# Patient Record
Sex: FEMALE | Race: WHITE | URBAN - METROPOLITAN AREA
[De-identification: names, ages, dates, MRNs, and addresses within clinical notes are randomized per-mention and may not be internally consistent; named-entity substitution may affect disease eponyms.]

---

## 2024-10-09 ENCOUNTER — OFFICE VISIT (OUTPATIENT)
Age: 24
End: 2024-10-09

## 2024-10-09 VITALS
WEIGHT: 177.8 LBS | HEART RATE: 89 BPM | RESPIRATION RATE: 18 BRPM | SYSTOLIC BLOOD PRESSURE: 123 MMHG | HEIGHT: 66 IN | TEMPERATURE: 99 F | BODY MASS INDEX: 28.57 KG/M2 | OXYGEN SATURATION: 97 % | DIASTOLIC BLOOD PRESSURE: 84 MMHG

## 2024-10-09 DIAGNOSIS — L03.317 CELLULITIS OF BUTTOCK: ICD-10-CM

## 2024-10-09 DIAGNOSIS — L05.91 PILONIDAL CYST WITHOUT ABSCESS: Primary | ICD-10-CM

## 2024-10-09 RX ORDER — FLUOXETINE 10 MG/1
10 CAPSULE ORAL DAILY
COMMUNITY

## 2024-10-09 RX ORDER — METRONIDAZOLE 500 MG/1
500 TABLET ORAL 2 TIMES DAILY
Qty: 14 TABLET | Refills: 0 | Status: SHIPPED | OUTPATIENT
Start: 2024-10-09 | End: 2024-10-16

## 2024-10-09 RX ORDER — CEPHALEXIN 500 MG/1
500 CAPSULE ORAL 4 TIMES DAILY
Qty: 40 CAPSULE | Refills: 0 | Status: SHIPPED | OUTPATIENT
Start: 2024-10-09 | End: 2024-10-19

## 2024-10-09 ASSESSMENT — ENCOUNTER SYMPTOMS
CONSTIPATION: 0
ABDOMINAL PAIN: 0
DIARRHEA: 0
SHORTNESS OF BREATH: 0
COUGH: 0
VOMITING: 0
NAUSEA: 0
BLOOD IN STOOL: 0

## 2024-10-09 NOTE — PROGRESS NOTES
plan with the patient. The patient also understands that early in the process of an illness, an Urgent Care work-up can be falsely reassuring. Therefore, if symptoms change, worsen or do not resolve and remain persistent, they should return to Urgent Care or seek further evaluation in the ED for immediate assessment. Additionally, they should continue care with their Primary provider. No further questions remained and patient was discharged to home.      Subjective    HPI  23-year-old female presents to clinic with complaint of tailbone pain.  Patient states that she noticed a knot about 4 days ago that has become particularly tender right at the crevice of the buttocks.  Patient states she has tried warm compresses but feels that it is enlarging each day.  Patient denies having noted any fever and states she feels quite well otherwise.  Temperature in clinic today is 99 °F.  On exam there is no evidence of fluctuance.  There is a firm nodule measuring approximately 1-1/2 x 1 inches, is red and has some warmth.  There is no fluctuance and notably there is no disruption of the skin or head to the lesion.  Therefore I explained to the patient I do not feel that incision into this currently closed wound is warranted.  Patient gives no history of having prior diagnosis of pilonidal cyst or hydration status.  Patient has no autoimmune disease though states her mother does struggle with Crohn's disease.  She states her stooling pattern has always been normal.  Review of Systems    Review of Systems   Constitutional:  Negative for chills, fatigue and fever.   Respiratory:  Negative for cough and shortness of breath.    Cardiovascular:  Negative for chest pain and palpitations.   Gastrointestinal:  Negative for abdominal pain, blood in stool, constipation, diarrhea, nausea and vomiting.   Genitourinary:  Negative for flank pain.   Musculoskeletal:  Negative for arthralgias and myalgias.   Neurological:  Negative for

## 2024-10-09 NOTE — PATIENT INSTRUCTIONS
You have a Pilonidal cyst that is red and firm but is not fluctuant like an abscess. Therefore, I have recommended against opening and attempting to drain ( as there is no fluid to drain currently) and will treat for cellulitis with 2 antibiotics. The Flagyl can be hard on the stomach and you should not drink alcohol while taking.     If area is not improving or rapidly worsens you should consult a general surgeon for possible removal. If rapidly worsening, you develop fever or feel unwell, please present to the Emergency room for management.    I have discussed any testing results, diagnosis and treatment plan. If symptoms worsen please present to your local ED for urgent matters. Otherwise, please follow up with your PCP. Thank you for seeing us today at [facility]. I  hope you feel better soon.